# Patient Record
Sex: FEMALE | Race: WHITE | NOT HISPANIC OR LATINO
[De-identification: names, ages, dates, MRNs, and addresses within clinical notes are randomized per-mention and may not be internally consistent; named-entity substitution may affect disease eponyms.]

---

## 2018-01-03 PROBLEM — Z00.00 ENCOUNTER FOR PREVENTIVE HEALTH EXAMINATION: Status: ACTIVE | Noted: 2018-01-03

## 2018-02-14 ENCOUNTER — APPOINTMENT (OUTPATIENT)
Dept: CARDIOLOGY | Facility: CLINIC | Age: 83
End: 2018-02-14

## 2020-01-07 ENCOUNTER — APPOINTMENT (OUTPATIENT)
Dept: GYNECOLOGIC ONCOLOGY | Facility: CLINIC | Age: 85
End: 2020-01-07
Payer: MEDICARE

## 2020-01-07 VITALS
SYSTOLIC BLOOD PRESSURE: 140 MMHG | DIASTOLIC BLOOD PRESSURE: 86 MMHG | WEIGHT: 195 LBS | TEMPERATURE: 98.5 F | HEIGHT: 64 IN | BODY MASS INDEX: 33.29 KG/M2

## 2020-01-07 DIAGNOSIS — N83.209 UNSPECIFIED OVARIAN CYST, UNSPECIFIED SIDE: ICD-10-CM

## 2020-01-07 DIAGNOSIS — Z80.3 FAMILY HISTORY OF MALIGNANT NEOPLASM OF BREAST: ICD-10-CM

## 2020-01-07 DIAGNOSIS — Z78.9 OTHER SPECIFIED HEALTH STATUS: ICD-10-CM

## 2020-01-07 DIAGNOSIS — J30.2 OTHER SEASONAL ALLERGIC RHINITIS: ICD-10-CM

## 2020-01-07 PROCEDURE — 99203 OFFICE O/P NEW LOW 30 MIN: CPT

## 2020-01-07 RX ORDER — ATENOLOL 50 MG/1
TABLET ORAL
Refills: 0 | Status: ACTIVE | COMMUNITY

## 2020-01-07 RX ORDER — LOSARTAN POTASSIUM 100 MG/1
TABLET, FILM COATED ORAL
Refills: 0 | Status: ACTIVE | COMMUNITY

## 2020-01-07 NOTE — HISTORY OF PRESENT ILLNESS
[FreeTextEntry1] : 85-year-old female  with a history of left ovarian cyst now presents for followup evaluation. The patient denies any abdominal pain vaginal bleeding or discharge. Repeat sonogram performed on  and compared with 2019 reveals only minimal change. Left ovary as described as being 3.3 x 2.1 x 2.8 cm previously it was 3 x 1.6 x 2.8 cm. It continues to be a simple cyst and can therefore be monitored.The patient continues to be asymptomatic and is closely followed by Dr. Reed.\par \par

## 2020-01-07 NOTE — DISCUSSION/SUMMARY
[FreeTextEntry1] : 85-year-old female  with a history of left ovarian cyst now presents for followup evaluation. The patient denies any abdominal pain vaginal bleeding or discharge. Repeat sonogram performed on  and compared with 2019 reveals only minimal change. Left ovary as described as being 3.3 x 2.1 x 2.8 cm previously it was 3 x 1.6 x 2.8 cm. It continues to be a simple cyst and can therefore be monitored.The patient continues to be asymptomatic and is closely followed by Dr. Reed.\par Her exam is essentially unchanged.\par Plan\par -Continue conservative management with serial sonograms pelvic exams and  values q6 months. Should the mass become symptomatic increase in size and complexity the surgical option will be reconsidered.\par -The patient is referred back to Dr. Reed for further management\par -She should be scheduled for a F/U visit in 6 months

## 2020-01-07 NOTE — OB HISTORY
[Total Preg ___] : : [unfilled] [Full Term ___] : [unfilled] (full-term) [Living ___] : [unfilled] (living) [Vaginal ___] : [unfilled] vaginal delivery(s) [Menarche Age ____] : age at menarche was [unfilled] [Definite ___ (Date)] : the last menstrual period was [unfilled] [Menopause  Age ____] : menopause occurred at age [unfilled]

## 2020-01-10 ENCOUNTER — APPOINTMENT (OUTPATIENT)
Dept: GYNECOLOGIC ONCOLOGY | Facility: CLINIC | Age: 85
End: 2020-01-10

## 2023-03-14 ENCOUNTER — NON-APPOINTMENT (OUTPATIENT)
Age: 88
End: 2023-03-14

## 2023-03-15 ENCOUNTER — EMERGENCY (EMERGENCY)
Facility: HOSPITAL | Age: 88
LOS: 0 days | Discharge: HOME | End: 2023-03-15
Attending: EMERGENCY MEDICINE
Payer: MEDICARE

## 2023-03-15 VITALS
RESPIRATION RATE: 20 BRPM | WEIGHT: 201.94 LBS | HEIGHT: 64 IN | TEMPERATURE: 98 F | OXYGEN SATURATION: 96 % | HEART RATE: 75 BPM | DIASTOLIC BLOOD PRESSURE: 84 MMHG | SYSTOLIC BLOOD PRESSURE: 186 MMHG

## 2023-03-15 VITALS
RESPIRATION RATE: 18 BRPM | DIASTOLIC BLOOD PRESSURE: 74 MMHG | OXYGEN SATURATION: 97 % | HEART RATE: 60 BPM | SYSTOLIC BLOOD PRESSURE: 154 MMHG

## 2023-03-15 DIAGNOSIS — L29.9 PRURITUS, UNSPECIFIED: ICD-10-CM

## 2023-03-15 DIAGNOSIS — Z91.010 ALLERGY TO PEANUTS: ICD-10-CM
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
## 2023-03-15 DIAGNOSIS — Z88.6 ALLERGY STATUS TO ANALGESIC AGENT: ICD-10-CM
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
## 2023-03-15 DIAGNOSIS — Z91.018 ALLERGY TO OTHER FOODS: ICD-10-CM

## 2023-03-15 DIAGNOSIS — T78.40XA ALLERGY, UNSPECIFIED, INITIAL ENCOUNTER: ICD-10-CM

## 2023-03-15 DIAGNOSIS — Z91.030 BEE ALLERGY STATUS: ICD-10-CM

## 2023-03-15 PROCEDURE — 99284 EMERGENCY DEPT VISIT MOD MDM: CPT

## 2023-03-15 PROCEDURE — 96374 THER/PROPH/DIAG INJ IV PUSH: CPT

## 2023-03-15 PROCEDURE — 99284 EMERGENCY DEPT VISIT MOD MDM: CPT | Mod: 25

## 2023-03-15 RX ORDER — EPINEPHRINE 0.3 MG/.3ML
0.3 INJECTION INTRAMUSCULAR; SUBCUTANEOUS
Qty: 1 | Refills: 0
Start: 2023-03-15

## 2023-03-15 RX ORDER — FAMOTIDINE 10 MG/ML
20 INJECTION INTRAVENOUS ONCE
Refills: 0 | Status: DISCONTINUED | OUTPATIENT
Start: 2023-03-15 | End: 2023-03-15

## 2023-03-15 RX ORDER — FAMOTIDINE 10 MG/ML
20 INJECTION INTRAVENOUS ONCE
Refills: 0 | Status: COMPLETED | OUTPATIENT
Start: 2023-03-15 | End: 2023-03-15

## 2023-03-15 RX ADMIN — FAMOTIDINE 20 MILLIGRAM(S): 10 INJECTION INTRAVENOUS at 19:32

## 2023-03-15 NOTE — ED PROVIDER NOTE - PROGRESS NOTE DETAILS
patient is feeling much better . patient being observed. no changes patient being observed . feeling better

## 2023-03-15 NOTE — ED PROVIDER NOTE - NS ED ATTENDING STATEMENT MOD
This was a shared visit with the RY. I reviewed and verified the documentation and independently performed the documented:

## 2023-03-15 NOTE — ED PROVIDER NOTE - CLINICAL SUMMARY MEDICAL DECISION MAKING FREE TEXT BOX
Patient was improving by the time she arrived in the ED.  Patient treated with Pepcid as well.  Patient observed with improvement of symptoms.  Patient will need to continue Benadryl as well as steroid at home.  Patient was started for any worsening symptoms or concerns and follow-up with PMD

## 2023-03-15 NOTE — ED PROVIDER NOTE - OBJECTIVE STATEMENT
.88-year-old female presents to the ED status post allergic reaction.  Patient ate something that had sunflower flour.  Patient is allergic to sunflower but did not realize that this product contains sunflower.  Immediately after ingestion patient began having itchy throat patient went to urgent care was given epinephrine around 4:30 PM

## 2023-03-15 NOTE — ED PROVIDER NOTE - PATIENT PORTAL LINK FT
You can access the FollowMyHealth Patient Portal offered by [facility] by registering at the following website: [email or web address]. By joining [facility]’s FollowMyHealth portal, you will also be able to view your health information using other applications (apps) compatible with our system.

## 2023-03-15 NOTE — ED ADULT NURSE NOTE - OBJECTIVE STATEMENT
anaphylactic reaction after eating bread, pt read ingredients after bread made with sunflower flour. Pt medicated at urgent care prior to arrival.

## 2023-03-15 NOTE — ED PROVIDER NOTE - ATTENDING APP SHARED VISIT CONTRIBUTION OF CARE
89-year-old female presents from urgent care for further evaluation of allergic reaction.  Patient states she ate bread that was made with sunflower flour and started to have scratchy, itchy throat as well as rash.  Patient states she took 3 Benadryl when she realized were .  Patient's daughter brought him to urgent care and patient was given epinephrine.  As well as steroid.  Patient symptoms are improving at this time.  no n/v On exam patient in NAD, AAOx3, no rash, lungs CTA B/L, no wheezes rhonchi's rales, mild erythematous oropharynx, abdomen soft nontender nondistended

## 2023-03-15 NOTE — ED ADULT TRIAGE NOTE - CHIEF COMPLAINT QUOTE
BIBA, as per EMS "pt ate sunflower seeds which she is allergic to; having an anaphylactic reaction; in [facility] given epi, took 3  benadryl at home"

## 2023-03-19 ENCOUNTER — EMERGENCY (EMERGENCY)
Facility: HOSPITAL | Age: 88
LOS: 0 days | Discharge: HOME | End: 2023-03-19
Attending: EMERGENCY MEDICINE
Payer: MEDICARE

## 2023-03-19 VITALS — HEART RATE: 65 BPM | DIASTOLIC BLOOD PRESSURE: 67 MMHG | SYSTOLIC BLOOD PRESSURE: 153 MMHG

## 2023-03-19 VITALS
SYSTOLIC BLOOD PRESSURE: 219 MMHG | OXYGEN SATURATION: 96 % | HEIGHT: 64 IN | HEART RATE: 66 BPM | TEMPERATURE: 98 F | DIASTOLIC BLOOD PRESSURE: 96 MMHG | RESPIRATION RATE: 18 BRPM

## 2023-03-19 DIAGNOSIS — R20.0 ANESTHESIA OF SKIN: ICD-10-CM

## 2023-03-19 DIAGNOSIS — Z91.018 ALLERGY TO OTHER FOODS: ICD-10-CM

## 2023-03-19 DIAGNOSIS — I10 ESSENTIAL (PRIMARY) HYPERTENSION: ICD-10-CM

## 2023-03-19 DIAGNOSIS — R42 DIZZINESS AND GIDDINESS: ICD-10-CM

## 2023-03-19 DIAGNOSIS — R51.9 HEADACHE, UNSPECIFIED: ICD-10-CM

## 2023-03-19 DIAGNOSIS — R03.0 ELEVATED BLOOD-PRESSURE READING, WITHOUT DIAGNOSIS OF HYPERTENSION: ICD-10-CM

## 2023-03-19 DIAGNOSIS — Z91.010 ALLERGY TO PEANUTS: ICD-10-CM

## 2023-03-19 DIAGNOSIS — Z88.6 ALLERGY STATUS TO ANALGESIC AGENT: ICD-10-CM

## 2023-03-19 DIAGNOSIS — Z91.030 BEE ALLERGY STATUS: ICD-10-CM

## 2023-03-19 DIAGNOSIS — R23.2 FLUSHING: ICD-10-CM

## 2023-03-19 LAB
ALBUMIN SERPL ELPH-MCNC: 4.2 G/DL — SIGNIFICANT CHANGE UP (ref 3.5–5.2)
ALP SERPL-CCNC: 72 U/L — SIGNIFICANT CHANGE UP (ref 30–115)
ALT FLD-CCNC: 24 U/L — SIGNIFICANT CHANGE UP (ref 0–41)
ANION GAP SERPL CALC-SCNC: 11 MMOL/L — SIGNIFICANT CHANGE UP (ref 7–14)
APTT BLD: 31.2 SEC — SIGNIFICANT CHANGE UP (ref 27–39.2)
AST SERPL-CCNC: 27 U/L — SIGNIFICANT CHANGE UP (ref 0–41)
BASOPHILS # BLD AUTO: 0.02 K/UL — SIGNIFICANT CHANGE UP (ref 0–0.2)
BASOPHILS NFR BLD AUTO: 0.2 % — SIGNIFICANT CHANGE UP (ref 0–1)
BILIRUB SERPL-MCNC: 0.5 MG/DL — SIGNIFICANT CHANGE UP (ref 0.2–1.2)
BUN SERPL-MCNC: 29 MG/DL — HIGH (ref 10–20)
CALCIUM SERPL-MCNC: 9.6 MG/DL — SIGNIFICANT CHANGE UP (ref 8.4–10.4)
CHLORIDE SERPL-SCNC: 101 MMOL/L — SIGNIFICANT CHANGE UP (ref 98–110)
CO2 SERPL-SCNC: 27 MMOL/L — SIGNIFICANT CHANGE UP (ref 17–32)
CREAT SERPL-MCNC: 1 MG/DL — SIGNIFICANT CHANGE UP (ref 0.7–1.5)
EGFR: 54 ML/MIN/1.73M2 — LOW
EOSINOPHIL # BLD AUTO: 0 K/UL — SIGNIFICANT CHANGE UP (ref 0–0.7)
EOSINOPHIL NFR BLD AUTO: 0 % — SIGNIFICANT CHANGE UP (ref 0–8)
GLUCOSE SERPL-MCNC: 103 MG/DL — HIGH (ref 70–99)
HCT VFR BLD CALC: 39.9 % — SIGNIFICANT CHANGE UP (ref 37–47)
HGB BLD-MCNC: 13.8 G/DL — SIGNIFICANT CHANGE UP (ref 12–16)
IMM GRANULOCYTES NFR BLD AUTO: 0.7 % — HIGH (ref 0.1–0.3)
INR BLD: 1.11 RATIO — SIGNIFICANT CHANGE UP (ref 0.65–1.3)
LYMPHOCYTES # BLD AUTO: 1.07 K/UL — LOW (ref 1.2–3.4)
LYMPHOCYTES # BLD AUTO: 12.8 % — LOW (ref 20.5–51.1)
MCHC RBC-ENTMCNC: 32.1 PG — HIGH (ref 27–31)
MCHC RBC-ENTMCNC: 34.6 G/DL — SIGNIFICANT CHANGE UP (ref 32–37)
MCV RBC AUTO: 92.8 FL — SIGNIFICANT CHANGE UP (ref 81–99)
MONOCYTES # BLD AUTO: 0.64 K/UL — HIGH (ref 0.1–0.6)
MONOCYTES NFR BLD AUTO: 7.6 % — SIGNIFICANT CHANGE UP (ref 1.7–9.3)
NEUTROPHILS # BLD AUTO: 6.6 K/UL — HIGH (ref 1.4–6.5)
NEUTROPHILS NFR BLD AUTO: 78.7 % — HIGH (ref 42.2–75.2)
NRBC # BLD: 0 /100 WBCS — SIGNIFICANT CHANGE UP (ref 0–0)
PLATELET # BLD AUTO: 189 K/UL — SIGNIFICANT CHANGE UP (ref 130–400)
POTASSIUM SERPL-MCNC: 4.4 MMOL/L — SIGNIFICANT CHANGE UP (ref 3.5–5)
POTASSIUM SERPL-SCNC: 4.4 MMOL/L — SIGNIFICANT CHANGE UP (ref 3.5–5)
PROT SERPL-MCNC: 7.1 G/DL — SIGNIFICANT CHANGE UP (ref 6–8)
PROTHROM AB SERPL-ACNC: 12.7 SEC — SIGNIFICANT CHANGE UP (ref 9.95–12.87)
RBC # BLD: 4.3 M/UL — SIGNIFICANT CHANGE UP (ref 4.2–5.4)
RBC # FLD: 13.2 % — SIGNIFICANT CHANGE UP (ref 11.5–14.5)
SODIUM SERPL-SCNC: 139 MMOL/L — SIGNIFICANT CHANGE UP (ref 135–146)
TROPONIN T SERPL-MCNC: <0.01 NG/ML — SIGNIFICANT CHANGE UP
WBC # BLD: 8.39 K/UL — SIGNIFICANT CHANGE UP (ref 4.8–10.8)
WBC # FLD AUTO: 8.39 K/UL — SIGNIFICANT CHANGE UP (ref 4.8–10.8)

## 2023-03-19 PROCEDURE — 70496 CT ANGIOGRAPHY HEAD: CPT | Mod: 26,MA

## 2023-03-19 PROCEDURE — 70496 CT ANGIOGRAPHY HEAD: CPT | Mod: MA

## 2023-03-19 PROCEDURE — 85730 THROMBOPLASTIN TIME PARTIAL: CPT

## 2023-03-19 PROCEDURE — 99285 EMERGENCY DEPT VISIT HI MDM: CPT

## 2023-03-19 PROCEDURE — 80053 COMPREHEN METABOLIC PANEL: CPT

## 2023-03-19 PROCEDURE — 96374 THER/PROPH/DIAG INJ IV PUSH: CPT | Mod: XU

## 2023-03-19 PROCEDURE — 85025 COMPLETE CBC W/AUTO DIFF WBC: CPT

## 2023-03-19 PROCEDURE — 93005 ELECTROCARDIOGRAM TRACING: CPT

## 2023-03-19 PROCEDURE — 84484 ASSAY OF TROPONIN QUANT: CPT

## 2023-03-19 PROCEDURE — 70498 CT ANGIOGRAPHY NECK: CPT | Mod: MA

## 2023-03-19 PROCEDURE — 93010 ELECTROCARDIOGRAM REPORT: CPT

## 2023-03-19 PROCEDURE — 70498 CT ANGIOGRAPHY NECK: CPT | Mod: 26,MA

## 2023-03-19 PROCEDURE — 99285 EMERGENCY DEPT VISIT HI MDM: CPT | Mod: 25

## 2023-03-19 PROCEDURE — 71045 X-RAY EXAM CHEST 1 VIEW: CPT

## 2023-03-19 PROCEDURE — 36415 COLL VENOUS BLD VENIPUNCTURE: CPT

## 2023-03-19 PROCEDURE — 71045 X-RAY EXAM CHEST 1 VIEW: CPT | Mod: 26

## 2023-03-19 PROCEDURE — 70450 CT HEAD/BRAIN W/O DYE: CPT | Mod: MA

## 2023-03-19 PROCEDURE — 85610 PROTHROMBIN TIME: CPT

## 2023-03-19 RX ORDER — ACETAMINOPHEN 500 MG
650 TABLET ORAL ONCE
Refills: 0 | Status: COMPLETED | OUTPATIENT
Start: 2023-03-19 | End: 2023-03-19

## 2023-03-19 RX ORDER — METOCLOPRAMIDE HCL 10 MG
10 TABLET ORAL ONCE
Refills: 0 | Status: COMPLETED | OUTPATIENT
Start: 2023-03-19 | End: 2023-03-19

## 2023-03-19 RX ORDER — SODIUM CHLORIDE 9 MG/ML
1000 INJECTION, SOLUTION INTRAVENOUS ONCE
Refills: 0 | Status: COMPLETED | OUTPATIENT
Start: 2023-03-19 | End: 2023-03-19

## 2023-03-19 RX ADMIN — SODIUM CHLORIDE 1000 MILLILITER(S): 9 INJECTION, SOLUTION INTRAVENOUS at 16:47

## 2023-03-19 RX ADMIN — Medication 10 MILLIGRAM(S): at 15:28

## 2023-03-19 RX ADMIN — Medication 650 MILLIGRAM(S): at 15:28

## 2023-03-19 NOTE — ED PROVIDER NOTE - CLINICAL SUMMARY MEDICAL DECISION MAKING FREE TEXT BOX
BP improved, symptoms resolved, seen and cleared by neuro, ed work up unremarkable, pt doesn't want to wait for urine sample, wants to go home, f/u as outpt pmd/neuro 1-2 weeks, ambulating w steady gait independently. strict return precautions

## 2023-03-19 NOTE — ED PROVIDER NOTE - PATIENT PORTAL LINK FT
You can access the FollowMyHealth Patient Portal offered by [facility] by registering at the following website: [email or web address]. By joining [facility]’s FollowMyHealth portal, you will also be able to view your health information using other applications (apps) compatible with our system.

## 2023-03-19 NOTE — ED PROVIDER NOTE - CARE PROVIDER_API CALL
Albert Fish)  EEGEpilepsy; Neurology  [address] [place] [address], [address]  [place] [address]  Phone: [phone]  Fax: [phone]  Follow Up Time: 1-3 Days

## 2023-03-19 NOTE — CONSULT NOTE ADULT - CONSULT REASON
headache flushed face and unsteady gait for 1 day.  Patient initially hypertensive to 219 systolic ct and cta negative Calcipotriene Pregnancy And Lactation Text: This medication has not been proven safe during pregnancy. It is unknown if this medication is excreted in breast milk.

## 2023-03-19 NOTE — ED PROVIDER NOTE - NS ED ROS FT
Constitutional:  No fevers or chills.  Eyes:  No visual changes, eye pain, or discharge.  ENT:  No hearing changes. No sore throat.  Neck:  No neck pain or stiffness.  Cardiac:  No CP or edema.  Resp:  No cough or SOB. No hemoptysis.   GI:  No nausea, vomiting, diarrhea, or abdominal pain.  :  No dysuria, frequency, or hematuria.  MSK:  No myalgias or joint pain/swelling.  Neuro:  (+) headache,  (+)dizziness, no weakness.  Skin:  No skin rash.

## 2023-03-19 NOTE — ED PROVIDER NOTE - OBJECTIVE STATEMENT
Curette Text: Prior to application of cantharidin the lesions were lightly pared with a curette. Include Z78.9 (Other Specified Conditions Influencing Health Status) As An Associated Diagnosis?: No Consent: The patient's consent was obtained including but not limited to risks of crusting, scabbing, scarring, blistering, darker or lighter pigmentary change, recurrence, incomplete removal and infection. Total Number Of Lesions Treated: 2 Post-Care Instructions: I reviewed with the patient in detail post-care instructions. The patient understands that the treated areas should be washed off 2 hours after application. Strength: Jose L Medical Necessity Information: It is in your best interest to select a reason for this procedure from the list below. All of these items fulfill various CMS LCD requirements except the new and changing color options. Medical Necessity Clause: This procedure was medically necessary because the lesions that were treated were: Detail Level: Zone 88-year-old female with past history of hypertension on atenolol and olmesartan with past allergy to sunflower products presents with elevated blood pressure systolic greater than 200 on arrival.  Yesterday around 5 PM patient began to have a flushed face and slight dizziness.  This persisted till this morning which patient took her blood pressure and was noted to be over 200 systolic.  Patient presents today for further evaluation.  Patient took all hypertensive medication as prescribed today and last night. Patient recently seen in ED for anaphylactic reaction to sunflower flour was discharged with prednisone.

## 2023-03-19 NOTE — ED PROVIDER NOTE - PROGRESS NOTE DETAILS
kendradrat- Patient's repeat blood pressure 170/88 patient reports improvement of symptoms.  Patient seen by neurology at bedside after resident discussion hypertension likely secondary to steroid use.

## 2023-03-19 NOTE — ED PROVIDER NOTE - ATTENDING CONTRIBUTION TO CARE
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  Feels lightheaded associated and flushing sensation, and mild throbbing headache.  No trauma.  No blood thinners.  No fever cough neck stiffness altered mental status.  No LOC.  No chest pain shortness of breath palpitations.  No numbness weakness blurry vision slurred speech.  No room spinning.  No dysuria frequency hematuria.  No abdominal pain nausea vomiting diarrhea.    On exam, AFVSS, blood pressure elevated, well appearing, No acute distress, NCAT, EOMI, PERRLA, MMM, Neck supple, LCTAB, RRR nl s1s2 No mrg, Abdomen Soft NTND, aaox3, CN 2-12 intact, No nystagmus.  5/5 motor x 4 ext, SILT x 4 extremities, No facial droop or slurred speech. No pronator drift.  Normal rapid alternating movement and finger nose finger bilaterally. No midline C/T/L tenderness to palpation or step off. Normal gait, No ataxia., No LE edema or calf TTP,    A/P; hypertension HA /dizzy, neurovascularly intact, will do labs UA EKG CT head pain control neurology consult reeval

## 2023-03-19 NOTE — CONSULT NOTE ADULT - SUBJECTIVE AND OBJECTIVE BOX
Neurology Consult    Patient is a 88y old  Female who presents with a chief complaint of "flushed face" Patient was seen and examined at the bedside. The patient was in ED recently for an allergic reaction, and she was sent home with a high dose steroid (50mg prednisone). Yesterday she started to feel numb in her head, with some lightheadedness. This was associated with some flushing of her face. She measured her BP at home and was elevated a few times.     denied vision loss, diplopia, speech disturbance, or vertigo. Pt denied headache , neck pain or any recent head or neck trauma     HPI:      PAST MEDICAL & SURGICAL HISTORY:      FAMILY HISTORY:      Social History: (-) x 3    Allergies    aspirin (Anaphylaxis)  Bananas (Anaphylaxis)  BEE STINGS (Anaphylaxis)  BROCCOLI, WATERMELON (Anaphylaxis)  CANTELOPE (Anaphylaxis)  chocolate (Anaphylaxis)  COFFEE (Anaphylaxis)  Corn (Anaphylaxis)  peanuts (Anaphylaxis)  SUNFLOWER (Anaphylaxis)  Wheat (Anaphylaxis)    Intolerances        MEDICATIONS  (STANDING):    MEDICATIONS  (PRN):      Review of systems:    Constitutional: as per HPI  Eyes: No eye pain or discharge  ENMT:  No difficulty hearing; No sinus or throat pain  Neck: No pain or stiffness  Respiratory: No cough, wheezing, chills or hemoptysis  Cardiovascular: No chest pain, palpitations, shortness of breath, dyspnea on exertion  Gastrointestinal: No abdominal pain, nausea, vomiting or hematemesis; No diarrhea or constipation.   Genitourinary: No dysuria, frequency, hematuria or incontinence  Neurological: As per HPI  Skin: No rashes or lesions   Endocrine: No heat or cold intolerance; No hair loss  Musculoskeletal: No joint pain or swelling  Psychiatric: No depression, anxiety, mood swings  Heme/Lymph: No easy bruising or bleeding gums    Vital Signs Last 24 Hrs  T(C): 36.6 (19 Mar 2023 14:22), Max: 36.6 (19 Mar 2023 14:22)  T(F): 97.8 (19 Mar 2023 14:22), Max: 97.8 (19 Mar 2023 14:22)  HR: 66 (19 Mar 2023 14:22) (66 - 66)  BP: 170/88 (19 Mar 2023 17:02) (170/88 - 219/96)  BP(mean): --  RR: 18 (19 Mar 2023 14:22) (18 - 18)  SpO2: 96% (19 Mar 2023 14:22) (96% - 96%)    Parameters below as of 19 Mar 2023 14:22  Patient On (Oxygen Delivery Method): room air        Examination:  General:  Appearance is consistent with chronologic age.  No abnormal facies.  Gross skin survey within normal limits.    Cognitive/Language:  The patient is oriented to person, place, time and date. Language with normal repetition, comprehension and naming.  Nondysarthric.    Eyes: intact VFF.  EOMI w/o nystagmus, skew or reported double vision.  PERRL.  No ptosis/weakness of eyelid closure.    Face:  Facial sensation normal V1 - 3, no facial asymmetry.    Ears/Nose/Throat:  Hearing grossly intact b/l.  Palate elevates midline.  Tongue and uvula midline.   Motor examination:   Normal tone, bulk and range of motion.  No tenderness, twitching, tremors or involuntary movements.  Formal Muscle Strength Testing: (MRC grade R/L) 5/5 UE; 5/5 LE.  No observable drift.  Reflexes: 2+ b/l biceps, triceps, brachioradialis, patella and Achilles.  Plantar response downgoing b/l.   Sensory examination:   Intact to light touch and pinprick, pain, temperature and proprioception and vibration in all extremities.  Cerebellum: FTN/HKS intact with normal FROYLAN in all limbs.  No dysmetria or dysdiadokinesia.    Gait narrow based and normal. Including tip-toes, and heel walk.         Labs:   CBC Full  -  ( 19 Mar 2023 15:26 )  WBC Count : 8.39 K/uL  RBC Count : 4.30 M/uL  Hemoglobin : 13.8 g/dL  Hematocrit : 39.9 %  Platelet Count - Automated : 189 K/uL  Mean Cell Volume : 92.8 fL  Mean Cell Hemoglobin : 32.1 pg  Mean Cell Hemoglobin Concentration : 34.6 g/dL  Auto Neutrophil # : 6.60 K/uL  Auto Lymphocyte # : 1.07 K/uL  Auto Monocyte # : 0.64 K/uL  Auto Eosinophil # : 0.00 K/uL  Auto Basophil # : 0.02 K/uL  Auto Neutrophil % : 78.7 %  Auto Lymphocyte % : 12.8 %  Auto Monocyte % : 7.6 %  Auto Eosinophil % : 0.0 %  Auto Basophil % : 0.2 %    03-19    139  |  101  |  29<H>  ----------------------------<  103<H>  4.4   |  27  |  1.0    Ca    9.6      19 Mar 2023 15:26    TPro  7.1  /  Alb  4.2  /  TBili  0.5  /  DBili  x   /  AST  27  /  ALT  24  /  AlkPhos  72  03-19    LIVER FUNCTIONS - ( 19 Mar 2023 15:26 )  Alb: 4.2 g/dL / Pro: 7.1 g/dL / ALK PHOS: 72 U/L / ALT: 24 U/L / AST: 27 U/L / GGT: x           PT/INR - ( 19 Mar 2023 15:26 )   PT: 12.70 sec;   INR: 1.11 ratio         PTT - ( 19 Mar 2023 15:26 )  PTT:31.2 sec        Neuroimaging:  NCHCT: CT Head No Cont:   ACC: [id number] EXAM:  CT ANGIO BRAIN (W)AW IC   ORDERED BY: SHAYY ALLEN     ACC: [id number] EXAM:  CT ANGIO NECK (W)AW IC   ORDERED BY: SHAYY ALLEN     ACC: [id number] EXAM:  CT BRAIN   ORDERED BY: SHAYY ALLEN     PROCEDURE DATE:  03/19/2023          INTERPRETATION:  CLINICAL INDICATION: Headache. .    TECHNIQUE: Noncontrast head CT was performed. Sagittal and coronal   reformatted images were obtained.    CT angiography of the intracranial (head) and extracranial (neck)   circulation was performed after contrast administration    Maximal intensity projection images were additionally included and   reviewed.    100 mls of Omnipaque 350 was administered intravenously without   complication and 0 mls were discarded.    Using a separate workstation, 3-D shaded surface reformations were made   of vasculature. 3-D reformations were reviewed and included in   interpretation of the official report.    CAROTID STENOSIS REFERENCE: (NASCET = 100x1-(N/D)). N=greatest narrowing.   D=normal distal diameter - MILD = <50% stenosis. - MODERATE = 50-69%   stenosis. - SEVERE = 70-89% stenosis. - HAIRLINE/CRITICAL = 90-99%   stenosis. - OCCLUDED = 100% stenosis.    COMPARISON: None.    FINDINGS:  CT BRAIN:    There is no evidence for acute intracranial hemorrhage, mass effect or   midline shift.    The basal cisterns are patent. There is no hydrocephalus.    There is no extra-axial collection.    The visualized orbits are unremarkable.    The calvarium is intact, without depressed fracture.    The visualized paranasal sinuses and mastoid air cells are clear.    HEAD CTA:    The internal carotid arteries demonstrate normal enhancement to the   intracranial circulation and [place]. There is minimal   atherosclerotic calcification involving the bilateral cavernous ICAs   without stenosis.    Anterior and middle cerebral arteries demonstrate normal enhancement and   symmetric arborization without intraluminal filling defect, stenosis,   occlusion, aneurysm or vascular malformation.    The intradural vertebral arteries demonstrate normal enhancement to the   vertebrobasilar confluence. Branch vasculature of the posterior   circulation are within normal limits. The posterior cerebral arteries   demonstrate symmetric enhancement andarborization without evidence for   aneurysm, stenosis, occlusion or vascular malformation.    Visualized dural venous sinuses and deep cerebral venous structures   demonstrate normal enhancement without evidence for filling defect.    NECK CTA:    The aortic arch and origins of the great vessels are within normal limits.    Right:  The origin of the right common carotid artery is normal. The   right common carotid artery is normal in course and caliber to the   carotid bifurcation. Origins of the internal and external carotid   arteries are normal by NASCET criteria. The right internal carotid artery   has normal course and caliber to the intracranial circulation.    The origin of the right vertebral artery is normal. The right vertebral   artery is normal in course and caliber to the intracranial circulation.    Left: The origin of the left common carotid artery is normal. The left   common carotid artery is normal in course and caliber to the carotid   bifurcation. Origins of the internal and external carotid arteries are   normal by NASCET criteria. The left internal carotid artery has normal   course and caliber to the intracranial circulation.    The origin of the left vertebral artery is normal. The left vertebral   artery is normal in course and caliber to the intracranial circulation.    IMPRESSION:  CT HEAD:  No acute intracranial pathology. No evidence of midline shift, mass   effect or intracranial hemorrhage.    CTA HEAD:  No evidence of flow-limiting stenosis, occlusion or aneurysm.    CTA NECK:  No evidence of carotid or vertebral artery stenosis.    --- End of Report ---            NEYDA TAYLOR MD; Attending Radiologist  This document has been electronically signed. Mar 19 2023  4:37PM (03-19-23 @ 16:03)      03-19-23 @ 18:30

## 2023-03-19 NOTE — ED PROVIDER NOTE - PHYSICAL EXAMINATION
PHYSICAL EXAM: I have reviewed current vital signs.  GENERAL: NAD, well-nourished; well-developed.  HEAD:  Normocephalic, atraumatic.  EYES: EOMI, PERRL, conjunctiva and sclera clear.  ENT: MMM, no erythema/exudates.  NECK: Supple, no JVD.  CHEST/LUNG: Clear to auscultation bilaterally; no wheezes, rales, or rhonchi.  HEART: Regular rate and rhythm, normal S1 and S2; no murmurs, rubs, or gallops.  ABDOMEN: Soft, nontender, nondistended.  EXTREMITIES:  2+ peripheral pulses; no clubbing, cyanosis, or edema.  PSYCH: Cooperative, appropriate, normal mood and affect.  NEUROLOGY: A&O x 3. Motor 5/5. Sensory intact. No focal neurological deficits. CN II - XII intact. (-) dysmetria, facial droop, pronator drift.  Patient states she feels dizzy and unsteady on her feet on ambulation but is able to walk in a straight line without difficulty.  SKIN: Warm and dry.

## 2023-03-19 NOTE — CONSULT NOTE ADULT - ASSESSMENT
This 88y old female with PMH of numbness in her head and flushed face. On arrival her BP was 219 mmHg, and her symptoms improved when her BP was decreased. Her exam showed no focal signs, and CTH and CTA were not significant. The patient symptoms are probably caused by her elevated BP, which was caused by the recent steroid treatment.     Recommendations;   No further intervention from neurology point of view

## 2023-11-28 ENCOUNTER — APPOINTMENT (OUTPATIENT)
Dept: ORTHOPEDIC SURGERY | Facility: CLINIC | Age: 88
End: 2023-11-28
Payer: MEDICARE

## 2023-11-28 PROCEDURE — 99203 OFFICE O/P NEW LOW 30 MIN: CPT

## 2024-01-02 ENCOUNTER — APPOINTMENT (OUTPATIENT)
Dept: MRI IMAGING | Facility: CLINIC | Age: 89
End: 2024-01-02

## 2024-01-05 ENCOUNTER — APPOINTMENT (OUTPATIENT)
Dept: ORTHOPEDIC SURGERY | Facility: CLINIC | Age: 89
End: 2024-01-05

## 2024-02-09 ENCOUNTER — APPOINTMENT (OUTPATIENT)
Dept: ORTHOPEDIC SURGERY | Facility: CLINIC | Age: 89
End: 2024-02-09
Payer: MEDICARE

## 2024-02-09 DIAGNOSIS — M54.50 LOW BACK PAIN, UNSPECIFIED: ICD-10-CM

## 2024-02-09 DIAGNOSIS — M17.11 UNILATERAL PRIMARY OSTEOARTHRITIS, RIGHT KNEE: ICD-10-CM

## 2024-02-09 PROCEDURE — 20610 DRAIN/INJ JOINT/BURSA W/O US: CPT | Mod: RT

## 2024-02-09 PROCEDURE — 99213 OFFICE O/P EST LOW 20 MIN: CPT | Mod: 25

## 2024-02-09 NOTE — PHYSICAL EXAM
[2+] : posterior tibialis pulse: 2+ [FreeTextEntry9] : Good range of motion with discomfort to lower back [de-identified] : Good strength [Right] : right knee [5___] : hamstring 5[unfilled]/5 [] : light touch is intact throughout [de-identified] : Ligamentously intact [TWNoteComboBox7] : flexion 115 degrees [de-identified] : extension 0 degrees

## 2024-02-09 NOTE — HISTORY OF PRESENT ILLNESS
[de-identified] : Patient is 89-year-old female accompanied by daughter here for reevaluation of lumbar spine, right knee.  Patient states that her lower back pain has been worsening with burning, numbness/tingling shooting down the right lower extremity.  Patient is also complaining of pain to the medial aspect of her right knee, denies recent injury/trauma, numbness or tingling.  Patient has been going for physical therapy of the lumbar spine, discussed that she has a total of 8 weeks of physical therapy over the past 2 to 3 months with no improvement.

## 2024-02-09 NOTE — DISCUSSION/SUMMARY
[de-identified] : Patient has failed conservative treatment of lumbar spine, no improvement with physical therapy.  Reviewed x-rays in detail with patient of right hip showing mild to moderate degenerative changes, right knee mild to moderate degenerative changes and lumbar spine degenerative changes.  MRI ordered of lumbar spine for further evaluation.  Call 2 to 3 days after MRI discuss results in detail.  Advised patient follow-up with pain and spine after MRI results.  Patient will continue physical therapy as tolerated.  Patient is having flareup of right knee osteoarthritis.  Discussed treatment options in detail including rest, ice that she can range of motion excess, physical therapy, cortisone injection.  Plans for cortisone injection right knee.   Anesthesia: ethyl chloride sprayed topically.    Dexamethasone 20mg per 5mL 2cc    Lidocaine 1% 2cc         Medication was injected in the right knee after verbal consent using sterile preparation and technique. The risks, benefits, and alternatives to cortisone injection were explained in full to the patient. Risks outlined include but are not limited to infection, sepsis, bleeding, scarring, skin discoloration, temporary increase in pain, syncopal episode, failure to resolve symptoms, allergic reaction, symptom recurrence, and elevation of blood sugar in diabetics. Patient understood the risks. All questions were answered. After discussion of options, patient requested an injection. Oral informed consent was obtained and sterile prep was done of the injection site. Sterile technique was utilized for the procedure including the preparation of the solutions used for the injection. Patient tolerated the procedure well. Advised to ice the injection site this evening. Call if any questions or concerns.  Patient understands agrees with plan.  Follow-up with pain management for lumbar spine, follow-up in 4 months for reevaluation of right knee.  Patient understands agrees with plan.

## 2024-02-28 ENCOUNTER — APPOINTMENT (OUTPATIENT)
Dept: MRI IMAGING | Facility: CLINIC | Age: 89
End: 2024-02-28

## 2024-06-14 ENCOUNTER — APPOINTMENT (OUTPATIENT)
Dept: ORTHOPEDIC SURGERY | Facility: CLINIC | Age: 89
End: 2024-06-14

## 2024-09-27 ENCOUNTER — LABORATORY RESULT (OUTPATIENT)
Age: 89
End: 2024-09-27

## 2024-09-27 ENCOUNTER — APPOINTMENT (OUTPATIENT)
Dept: ORTHOPEDIC SURGERY | Facility: CLINIC | Age: 89
End: 2024-09-27
Payer: MEDICARE

## 2024-09-27 DIAGNOSIS — M17.11 UNILATERAL PRIMARY OSTEOARTHRITIS, RIGHT KNEE: ICD-10-CM

## 2024-09-27 PROCEDURE — 99213 OFFICE O/P EST LOW 20 MIN: CPT | Mod: 25

## 2024-09-27 PROCEDURE — 20610 DRAIN/INJ JOINT/BURSA W/O US: CPT | Mod: RT

## 2024-09-27 NOTE — HISTORY OF PRESENT ILLNESS
[de-identified] : Patient is a 89-year-old female here for reevaluation of right knee.  Patient was seen in February 2024 for her right knee was given a cortisone injection.  Patient states that she did not have any improvement with cortisone injection.  Patient states that she has also been noticing swelling of the right knee, decreased range of motion.  Denies reinjury/trauma, numbness tingling, instability  Right knee: Moderate joint effusion no ecchymosis, no erythema tender to palpation to medial lateral joint line range of motion 0 degrees to 115 degrees with discomfort, no gross instability neurovascular intact negative Homans  Reviewed prior right knee x-rays detail patient showing moderate to advanced osteoarthritis  Patient has flareup of osteoarthritis of right knee with joint effusion.  Plan today is for right knee joint aspiration.  Discussed indications and risks in detail.  Using sterile method with Betadine and alcohol 80 cc of straw-colored clear fluid drained from knee.  Joint fluid was sent out for testing.  Will review results in detail.  Advised ice/heat, range of motion exercises Motrin system was needed for pain.  Discussed possibility of possible future Visco injections if aspiration is negative

## 2024-09-27 NOTE — HISTORY OF PRESENT ILLNESS
[de-identified] : Patient is a 89-year-old female here for reevaluation of right knee.  Patient was seen in February 2024 for her right knee was given a cortisone injection.  Patient states that she did not have any improvement with cortisone injection.  Patient states that she has also been noticing swelling of the right knee, decreased range of motion.  Denies reinjury/trauma, numbness tingling, instability  Right knee: Moderate joint effusion no ecchymosis, no erythema tender to palpation to medial lateral joint line range of motion 0 degrees to 115 degrees with discomfort, no gross instability neurovascular intact negative Homans  Reviewed prior right knee x-rays detail patient showing moderate to advanced osteoarthritis  Patient has flareup of osteoarthritis of right knee with joint effusion.  Plan today is for right knee joint aspiration.  Discussed indications and risks in detail.  Using sterile method with Betadine and alcohol 80 cc of straw-colored clear fluid drained from knee.  Joint fluid was sent out for testing.  Will review results in detail.  Advised ice/heat, range of motion exercises Motrin system was needed for pain.  Discussed possibility of possible future Visco injections if aspiration is negative

## 2024-09-30 ENCOUNTER — APPOINTMENT (OUTPATIENT)
Dept: ORTHOPEDIC SURGERY | Facility: CLINIC | Age: 89
End: 2024-09-30
Payer: MEDICARE

## 2024-09-30 DIAGNOSIS — M54.50 LOW BACK PAIN, UNSPECIFIED: ICD-10-CM

## 2024-09-30 LAB
SYCRY CLARITY: CLEAR
SYCRY COLOR: YELLOW
SYCRY ID: ABNORMAL
SYCRY TUBE: NORMAL

## 2024-09-30 PROCEDURE — 99213 OFFICE O/P EST LOW 20 MIN: CPT

## 2024-09-30 PROCEDURE — 72110 X-RAY EXAM L-2 SPINE 4/>VWS: CPT

## 2024-09-30 NOTE — ASSESSMENT
[FreeTextEntry1] : 89-year-old female with right lumbar radiculopathy secondary to degenerative disc disease.  She will undergo physical therapy 2-3 times a week for the next 6 weeks and I provided a prescription to use a lumbar support brace as needed.  She will follow-up in 6 weeks for reassessment and if no improvement at that time an MRI of the lumbar spine will be ordered for further evaluation her and her daughter verbalized understanding and agreement.

## 2024-09-30 NOTE — IMAGING
[de-identified] : Lumbar spine: 5 out of 5 strength the lower extremities, 2+ reflexes, mild stiffness with lumbar flexion, positive tenderness of right lumbar paraspinal muscles, negative straight leg raise bilaterally.  X-ray lumbar spine 4 views: L4 spondylolisthesis, degenerative changes throughout, spinal curvature noted.

## 2024-09-30 NOTE — REVIEW OF SYSTEMS
[Arthralgia] : arthralgia [Joint Pain] : joint pain [Joint Stiffness] : joint stiffness [Negative] : Constitutional [de-identified] : R lower extremity weakness

## 2024-09-30 NOTE — HISTORY OF PRESENT ILLNESS
[de-identified] : Ms. Waldrop is an 89 year old female who presents to the office accompanied by her daughter for evaluation of lower back pain that has been occurring for almost a year.  She states she initially thought it was her right leg or her knee as she had no back pain but chest pain and difficulty ambulating with respect to the right leg.  She saw her PCP who felt it was coming from her back as well as her knee surgeon who also felt it was coming from her back and she had started physical therapy which she ended in May without improvement.  They tried to order an MRI for further assessment however she has not performed any recent physical therapy and therefore it had been denied.  She is not taking anything for pain as she is allergic to NSAIDs and is afraid to take any type of medication.  She was given gabapentin by her primary care doctor and is considering taking it.  She states she can hardly walk and if she does she uses a cane as she feels her leg is going to give out on her.  She describes it as a heaviness as well and states sitting is the only thing that alleviates the discomfort and weakness.

## 2024-10-22 ENCOUNTER — APPOINTMENT (OUTPATIENT)
Dept: ORTHOPEDIC SURGERY | Facility: CLINIC | Age: 89
End: 2024-10-22
Payer: MEDICARE

## 2024-10-22 DIAGNOSIS — M17.11 UNILATERAL PRIMARY OSTEOARTHRITIS, RIGHT KNEE: ICD-10-CM

## 2024-10-22 PROCEDURE — 99203 OFFICE O/P NEW LOW 30 MIN: CPT

## 2024-11-15 ENCOUNTER — APPOINTMENT (OUTPATIENT)
Dept: ORTHOPEDIC SURGERY | Facility: CLINIC | Age: 89
End: 2024-11-15
Payer: MEDICARE

## 2024-11-15 DIAGNOSIS — M54.50 LOW BACK PAIN, UNSPECIFIED: ICD-10-CM

## 2024-11-15 PROCEDURE — 99214 OFFICE O/P EST MOD 30 MIN: CPT

## 2024-11-26 ENCOUNTER — APPOINTMENT (OUTPATIENT)
Dept: MRI IMAGING | Facility: CLINIC | Age: 89
End: 2024-11-26
Payer: MEDICARE

## 2024-11-26 PROCEDURE — 72148 MRI LUMBAR SPINE W/O DYE: CPT

## 2024-12-02 ENCOUNTER — APPOINTMENT (OUTPATIENT)
Dept: ORTHOPEDIC SURGERY | Facility: CLINIC | Age: 88
End: 2024-12-02
Payer: MEDICARE

## 2024-12-02 DIAGNOSIS — M17.11 UNILATERAL PRIMARY OSTEOARTHRITIS, RIGHT KNEE: ICD-10-CM

## 2024-12-02 DIAGNOSIS — M54.50 LOW BACK PAIN, UNSPECIFIED: ICD-10-CM

## 2024-12-02 PROCEDURE — 99213 OFFICE O/P EST LOW 20 MIN: CPT

## 2025-01-22 NOTE — ED PROVIDER NOTE - PROGRESS NOTE ADDITIONAL1
Render Note In Bullet Format When Appropriate: No Number Of Freeze-Thaw Cycles: 1 freeze-thaw cycle Detail Level: Detailed Consent: The patient's consent was obtained including but not limited to risks of crusting, scabbing, blistering, scarring, darker or lighter pigmentary change, recurrence, incomplete removal and infection. Post-Care Instructions: I reviewed with the patient in detail post-care instructions. Patient is to wear sunprotection, and avoid picking at any of the treated lesions. Pt may apply Vaseline to crusted or scabbing areas. Duration Of Freeze Thaw-Cycle (Seconds): 2 Show Aperture Variable?: Yes Additional Progress Note...

## 2025-01-30 ENCOUNTER — TRANSCRIPTION ENCOUNTER (OUTPATIENT)
Age: 89
End: 2025-01-30